# Patient Record
Sex: MALE | Race: WHITE | URBAN - METROPOLITAN AREA
[De-identification: names, ages, dates, MRNs, and addresses within clinical notes are randomized per-mention and may not be internally consistent; named-entity substitution may affect disease eponyms.]

---

## 2020-11-16 ENCOUNTER — OFFICE VISIT (OUTPATIENT)
Dept: URBAN - METROPOLITAN AREA MEDICAL CENTER 25 | Facility: MEDICAL CENTER | Age: 70
End: 2020-11-16
Payer: COMMERCIAL

## 2020-11-16 DIAGNOSIS — Z12.11 COLON CANCER SCREENING: ICD-10-CM

## 2020-11-16 PROCEDURE — G9935 CANC NOT DETECTD DURING SRCN: HCPCS | Performed by: INTERNAL MEDICINE

## 2020-11-16 PROCEDURE — G0121 COLON CA SCRN NOT HI RSK IND: HCPCS | Performed by: INTERNAL MEDICINE

## 2021-09-02 ENCOUNTER — TELEPHONE ENCOUNTER (OUTPATIENT)
Dept: URBAN - METROPOLITAN AREA CLINIC 19 | Facility: CLINIC | Age: 71
End: 2021-09-02

## 2021-09-30 ENCOUNTER — OFFICE VISIT (OUTPATIENT)
Dept: URBAN - METROPOLITAN AREA CLINIC 19 | Facility: CLINIC | Age: 71
End: 2021-09-30
Payer: COMMERCIAL

## 2021-09-30 ENCOUNTER — WEB ENCOUNTER (OUTPATIENT)
Dept: URBAN - METROPOLITAN AREA CLINIC 19 | Facility: CLINIC | Age: 71
End: 2021-09-30

## 2021-09-30 DIAGNOSIS — R43.2 DYSGEUSIA: ICD-10-CM

## 2021-09-30 DIAGNOSIS — R10.13 DYSPEPSIA: ICD-10-CM

## 2021-09-30 DIAGNOSIS — D50.9 IRON DEFICIENCY ANEMIA: ICD-10-CM

## 2021-09-30 PROCEDURE — 99243 OFF/OP CNSLTJ NEW/EST LOW 30: CPT | Performed by: INTERNAL MEDICINE

## 2021-09-30 NOTE — HPI-TODAY'S VISIT:
Patient was referred by his PCP, Dr. Boris Martinez, for evaluation of iron deficiency anemia.  A copy of this note will be sent to the referring provider.  I performed a screening colonoscopy on 11/16/20 that was unremarkable except for diverticulosis.  Labs from last month showed a mild microcytic anemia (hemoglobin 11 g/dL) with low ferritin.  He has not seen any obvious blood in stool.  In the past, he had been prescribed PPI therapy for heartburn/dyspepsia.  He denies heartburn, but he has intermittent nausea and metallic taste in mouth now.  He has been told that he was anemic in the past when donating blood.

## 2021-10-01 LAB
A/G RATIO: 1.6
ALBUMIN: 4.4
ALKALINE PHOSPHATASE: 71
ALT (SGPT): 21
AST (SGOT): 21
BILIRUBIN, TOTAL: 0.3
BUN/CREATININE RATIO: 17
BUN: 22
CALCIUM: 10.1
CARBON DIOXIDE, TOTAL: 23
CHLORIDE: 104
CREATININE: 1.32
EGFR IF AFRICN AM: 63
EGFR IF NONAFRICN AM: 55
FERRITIN, SERUM: 28
GLOBULIN, TOTAL: 2.8
GLUCOSE: 93
HEMATOCRIT: 45.5
HEMOGLOBIN: 13.9
IRON BIND.CAP.(TIBC): 409
IRON SATURATION: 36
IRON: 147
MCH: 26
MCHC: 30.5
MCV: 85
NRBC: (no result)
PLATELETS: 317
POTASSIUM: 4.7
PROTEIN, TOTAL: 7.2
RBC: 5.35
RDW: 23.2
SODIUM: 143
UIBC: 262
WBC: 6.6

## 2021-10-08 ENCOUNTER — TELEPHONE ENCOUNTER (OUTPATIENT)
Dept: URBAN - METROPOLITAN AREA CLINIC 19 | Facility: CLINIC | Age: 71
End: 2021-10-08

## 2021-10-15 ENCOUNTER — TELEPHONE ENCOUNTER (OUTPATIENT)
Dept: URBAN - METROPOLITAN AREA CLINIC 19 | Facility: CLINIC | Age: 71
End: 2021-10-15

## 2021-10-19 ENCOUNTER — TELEPHONE ENCOUNTER (OUTPATIENT)
Dept: URBAN - METROPOLITAN AREA CLINIC 19 | Facility: CLINIC | Age: 71
End: 2021-10-19

## 2021-10-19 PROBLEM — 271801002 DYSGEUSIA: Status: ACTIVE | Noted: 2021-09-30

## 2021-11-01 ENCOUNTER — OFFICE VISIT (OUTPATIENT)
Dept: URBAN - METROPOLITAN AREA LAB 2 | Facility: LAB | Age: 71
End: 2021-11-01
Payer: COMMERCIAL

## 2021-11-01 ENCOUNTER — TELEPHONE ENCOUNTER (OUTPATIENT)
Dept: URBAN - METROPOLITAN AREA CLINIC 19 | Facility: CLINIC | Age: 71
End: 2021-11-01

## 2021-11-01 DIAGNOSIS — D50.9 ANEMIA: ICD-10-CM

## 2021-11-01 DIAGNOSIS — K31.89 ACQUIRED DEFORMITY OF DUODENUM: ICD-10-CM

## 2021-11-01 DIAGNOSIS — R10.13 ABDOMINAL DISCOMFORT, EPIGASTRIC: ICD-10-CM

## 2021-11-01 PROCEDURE — 43239 EGD BIOPSY SINGLE/MULTIPLE: CPT | Performed by: INTERNAL MEDICINE

## 2021-11-10 ENCOUNTER — TELEPHONE ENCOUNTER (OUTPATIENT)
Dept: URBAN - METROPOLITAN AREA CLINIC 23 | Facility: CLINIC | Age: 71
End: 2021-11-10

## 2021-11-12 ENCOUNTER — TELEPHONE ENCOUNTER (OUTPATIENT)
Dept: URBAN - METROPOLITAN AREA CLINIC 5 | Facility: CLINIC | Age: 71
End: 2021-11-12

## 2021-11-12 ENCOUNTER — OFFICE VISIT (OUTPATIENT)
Dept: URBAN - METROPOLITAN AREA CLINIC 127 | Facility: CLINIC | Age: 71
End: 2021-11-12
Payer: COMMERCIAL

## 2021-11-12 ENCOUNTER — TELEPHONE ENCOUNTER (OUTPATIENT)
Dept: URBAN - METROPOLITAN AREA CLINIC 19 | Facility: CLINIC | Age: 71
End: 2021-11-12

## 2021-11-12 DIAGNOSIS — D50.9 ANEMIA: ICD-10-CM

## 2021-11-12 PROCEDURE — 91110 GI TRC IMG INTRAL ESOPH-ILE: CPT | Performed by: INTERNAL MEDICINE

## 2021-11-16 ENCOUNTER — LAB OUTSIDE AN ENCOUNTER (OUTPATIENT)
Dept: URBAN - METROPOLITAN AREA CLINIC 19 | Facility: CLINIC | Age: 71
End: 2021-11-16

## 2021-11-29 ENCOUNTER — OFFICE VISIT (OUTPATIENT)
Dept: URBAN - METROPOLITAN AREA LAB 2 | Facility: LAB | Age: 71
End: 2021-11-29

## 2022-01-07 ENCOUNTER — DASHBOARD ENCOUNTERS (OUTPATIENT)
Age: 72
End: 2022-01-07

## 2022-01-07 ENCOUNTER — OFFICE VISIT (OUTPATIENT)
Dept: URBAN - METROPOLITAN AREA CLINIC 19 | Facility: CLINIC | Age: 72
End: 2022-01-07
Payer: COMMERCIAL

## 2022-01-07 DIAGNOSIS — E61.1 IRON DEFICIENCY: ICD-10-CM

## 2022-01-07 DIAGNOSIS — R12 HEARTBURN: ICD-10-CM

## 2022-01-07 DIAGNOSIS — R10.13 DYSPEPSIA: ICD-10-CM

## 2022-01-07 PROBLEM — 16331000 HEARTBURN: Status: ACTIVE | Noted: 2022-01-07

## 2022-01-07 PROBLEM — 162031009 DYSPEPSIA: Status: ACTIVE | Noted: 2021-09-30

## 2022-01-07 PROBLEM — 35240004 IRON DEFICIENCY: Status: ACTIVE | Noted: 2022-01-07

## 2022-01-07 PROCEDURE — 99214 OFFICE O/P EST MOD 30 MIN: CPT | Performed by: INTERNAL MEDICINE

## 2022-01-07 RX ORDER — TAMSULOSIN HYDROCHLORIDE 0.4 MG/1
1 CAPSULE CAPSULE ORAL ONCE A DAY
Status: ACTIVE | COMMUNITY

## 2022-01-07 RX ORDER — DEXTROAMPHETAMINE SULFATE 15 MG/1
1 TABLET IN THE MORNING TABLET ORAL ONCE A DAY
Status: ACTIVE | COMMUNITY

## 2022-01-07 RX ORDER — PANTOPRAZOLE SODIUM 40 MG/1
1 TABLET TABLET, DELAYED RELEASE ORAL ONCE A DAY
Status: ACTIVE | COMMUNITY

## 2022-01-07 RX ORDER — ESCITALOPRAM OXALATE 5 MG/1
1 TABLET TABLET, FILM COATED ORAL ONCE A DAY
Status: ACTIVE | COMMUNITY

## 2022-01-07 RX ORDER — ANORECTAL OINTMENT 15.7; .44; 24; 20.6 G/100G; G/100G; G/100G; G/100G
AS DIRECTED OINTMENT TOPICAL
Status: ACTIVE | COMMUNITY

## 2022-01-07 NOTE — HPI-TODAY'S VISIT:
Patient was referred by his PCP, Dr. Boris Martinez, for evaluation of iron deficiency anemia.  A copy of this note will be sent to the referring provider.  I performed a screening colonoscopy on 11/16/20 that was unremarkable except for diverticulosis.  Labs from last month showed a mild microcytic anemia (hemoglobin 11 g/dL) with low ferritin.  He has not seen any obvious blood in stool.  In the past, he had been prescribed PPI therapy for heartburn/dyspepsia.  He denies heartburn, but he has intermittent nausea and metallic taste in mouth now.  He has been told that he was anemic in the past when donating blood.  1/7/22:  Patient presents for follow-up of his heartburn and iron deficiency (without anemia).  He has now undergone an EGD/colonoscopy and PillCam with no obvious lesions seen to explain the iron deficiency - still on iron supplements.  He still complains that he has severe reflux symptoms - acid taste in mouth and a feeling that his stomach is coming up into his chest.  His PCP thought he may have an element of anxiety - prescribed Lexapro.  He has some anxiety related to the care of his invalid wife, and he works as a .  He has noticed some improvement.  He also states that he wakes up with a thick brown material in his mouth.  His dentist says that reflux is affecting his teeth - I did not see any evidence of reflux esophagitis on my exam but agreed to order further studies.